# Patient Record
Sex: MALE | Race: OTHER
[De-identification: names, ages, dates, MRNs, and addresses within clinical notes are randomized per-mention and may not be internally consistent; named-entity substitution may affect disease eponyms.]

---

## 2020-11-03 ENCOUNTER — HOSPITAL ENCOUNTER (EMERGENCY)
Dept: HOSPITAL 38 - CC.ED | Age: 20
Discharge: ANOTHER HOSPITAL | End: 2020-11-03
Payer: SELF-PAY

## 2020-11-03 DIAGNOSIS — K35.30: Primary | ICD-10-CM

## 2020-11-03 LAB
CHLORIDE SERPL-SCNC: 101 MEQ/L (ref 98–106)
SODIUM SERPL-SCNC: 138 MEQ/L (ref 136–145)

## 2020-11-03 NOTE — EDM.PDOC
ED HPI GENERAL MEDICAL PROBLEM





- General


Chief Complaint: Abdominal Pain


Stated Complaint: ABD PAIN


Time Seen by Provider: 11/03/20 10:30


Source of Information: Reports: 


History Limitations: Reports: No Limitations





- History of Present Illness


INITIAL COMMENTS - FREE TEXT/NARRATIVE: 


Antonio is a 20 yr old who presents to the ED with complaints of abdominal pain. 

Unable to speak or understand English and currently using  for 

assistance. He states the pain started in the middle of the night. States he has

been vomiting as well since onset. Points to upper abdomen/epigastric area to 

where his pain is at. He is having a difficult time getting comfortable in the 

ED. Denies any diarrhea. Last known bowel movement was yesterday morning adn was

normal. Last known meal was yesterday evening and ate chicken he had bought that

was already cooked. Denies any other past medical history. States he did take a 

pill this morning but isn't sure what pill he took as it is in English. 


Duration: Constant


Location: Reports: Abdomen


  ** Abdominal


Pain Score (Numeric/FACES): 10





- Related Data


                                    Allergies











Allergy/AdvReac Type Severity Reaction Status Date / Time


 


No Known Allergies Allergy   Verified 11/03/20 10:22











Home Meds: 


                                    Home Meds





. [No Known Home Meds]  11/03/20 [History]











Past Medical History





- Past Health History


Medical/Surgical History: Denies Medical/Surgical History





Social & Family History





- Family History


Family Medical History: Noncontributory





- Tobacco Use


Tobacco Use Status *Q: Never Tobacco User





- Alcohol Use


Date of Last Drink: 11/02/20


Alcohol Use in Last Twelve Months: Yes


Alcohol Use Frequency: Socially


Alcohol Use Comment: Last alcohol use was 2 beers last night





- Recreational Drug Use


Recreational Drug Use: No





ED ROS GENERAL





- Review of Systems


Review Of Systems: See Below


Constitutional: Reports: No Symptoms.  Denies: Fever, Decreased Appetite


HEENT: Reports: No Symptoms


Respiratory: Reports: No Symptoms


Cardiovascular: Reports: No Symptoms


GI/Abdominal: Reports: Abdominal Pain, Nausea, Vomiting.  Denies: Bloody Stool, 

Diarrhea, Flatus


: Reports: No Symptoms


Musculoskeletal: Reports: Back Pain (states from throwing up a lot)


Skin: Reports: No Symptoms


Neurological: Reports: No Symptoms


Psychiatric: Reports: No Symptoms





ED EXAM, GI/ABD





- Physical Exam


Exam: See Below


Exam Limited By: Language Barrier


General Appearance: Alert, Mild Distress, Moderate Distress


Eyes: Bilateral: Normal Appearance


Nose: Normal Inspection, Normal Mucosa, No Blood


Throat/Mouth: Normal Inspection, Normal Lips, Normal Teeth, Normal Gums, Normal 

Oropharynx, Normal Voice, No Airway Compromise


Head: Atraumatic, Normocephalic


Neck: Normal Inspection, Supple, Non-Tender


Respiratory/Chest: No Respiratory Distress, Lungs Clear, Normal Breath Sounds, 

No Accessory Muscle Use


Cardiovascular: Normal Peripheral Pulses, Regular Rate, Rhythm, No Edema, No 

Murmur


GI/Abdominal Exam: Normal Bowel Sounds, No Organomegaly, No Distention, Tender 

(upper abdomen ), Other (negative Rhosving, negative McBurney's point)


Back Exam: No: CVA Tenderness (L), CVA Tenderness (R)


Extremities: Normal Inspection, No Pedal Edema


Neurological: Alert, Normal Cognition, No Motor/Sensory Deficits


Psychiatric: Normal Affect, Normal Mood


Skin Exam: Warm, Dry, Intact, Normal Color, No Rash





Course





- Vital Signs


Last Recorded V/S: 


                                Last Vital Signs











Temp  99.6 F   11/03/20 12:58


 


Pulse  76   11/03/20 12:58


 


Resp  18   11/03/20 12:58


 


BP  111/72   11/03/20 12:58


 


Pulse Ox  99   11/03/20 12:58














- Orders/Labs/Meds


Orders: 


                               Active Orders 24 hr











 Category Date Time Status


 


 Abdomen Pelvis w Cont [CT] Stat Exams  11/03/20 11:13 Taken


 


 Potassium Chloride [KCL 20 MEQ in Water 100 ML] 20 meq Med  11/03/20 11:14 

Active





 Premix Bag 1 bag   





 IV ONETIME   


 


 Sodium Chloride 0.9% [Normal Saline] 1,000 ml Med  11/03/20 11:00 Active





 IV ASDIRECTED   








                                Medication Orders





Sodium Chloride (Normal Saline)  1,000 mls @ 250 mls/hr IV ASDIRECTED PAWAN


   Stop: 11/07/20 10:46


   Last Admin: 11/03/20 10:53  Dose: 250 mls/hr


   Documented by: LOY


Potassium Chloride 20 meq/ (Premix)  100 mls @ 25 mls/hr IV ONETIME ONE


   Stop: 11/03/20 15:13


   Last Admin: 11/03/20 11:44  Dose: 25 mls/hr


   Documented by: LOY








Labs: 


                                Laboratory Tests











  11/03/20 11/03/20 11/03/20 Range/Units





  10:12 10:12 10:30 


 


WBC   17.5 H   (5.0-10.0)  10^3/uL


 


RBC   5.84   (4.50-6.00)  10^6/uL


 


Hgb   16.3   (14.0-18.0)  g/dL


 


Hct   46.5   (40.0-54.0)  %


 


MCV   79.6 L   (82.0-94.0)  fL


 


MCH   27.9   (27.0-32.0)  pg


 


MCHC   35.1   (33.0-38.0)  g/dL


 


RDW Coeff of Stevenson   13.0   (11.0-15.0)  %


 


Plt Count   249   (150-400)  10^3/uL


 


Neut % (Auto)   91.3 H   (35-85)  %


 


Lymph % (Auto)   4.7 L   (10-55)  %


 


Mono % (Auto)   3.8   (0-16)  %


 


Eos % (Auto)   0.1   (0-5)  %


 


Baso % (Auto)   0.1   (0-3)  %


 


Neut # (Auto)   16.02 H   (1.80-7.00)  10^3/uL


 


Lymph # (Auto)   0.83 L   (1.00-4.80)  10^3/uL


 


Mono # (Auto)   0.66   (0.00-0.80)  10^3/uL


 


Eos # (Auto)   0.01   (0.00-0.45)  10^3/uL


 


Baso # (Auto)   0.02   10^3/uL


 


Sodium    138  (136-145)  mEq/L


 


Potassium    3.2 L  (3.5-5.0)  mEq/L


 


Chloride    101  ()  mEq/L


 


Carbon Dioxide    24  (21-32)  mmol/L


 


BUN    16  (7-18)  mg/dL


 


Creatinine    1.0  (0.7-1.3)  mg/dL


 


Est Cr Clr Drug Dosing    106.33  mL/min


 


Estimated GFR (MDRD)    > 60  (>=60)  mL/min


 


Glucose    121 H  (75-99)  mg/dL


 


Lactic Acid     (0.4-2.0)  mmol/L


 


Calcium    9.3  (8.4-10.1)  mg/dL


 


Total Bilirubin    2.2 H  (0.0-1.0)  mg/dL


 


AST    22  (15-37)  U/L


 


ALT    21  (12-78)  U/L


 


Alkaline Phosphatase    82  ()  U/L


 


C-Reactive Protein    0.2  (0.2-0.8)  mg/dL


 


Total Protein    8.1  (6.4-8.2)  g/dL


 


Albumin    4.8  (3.4-5.0)  g/dL


 


Amylase    27  ()  U/L


 


Lipase    49 L  ()  U/L


 


Urine Color  Yellow    (YELLOW)  


 


Urine Appearance  Cloudy    (CLEAR)  


 


Urine pH  8.5 H    (4.5-8.0)  


 


Ur Specific Gravity  1.025 H    (1.003-1.020)  


 


Urine Protein  Negative    (NEGATIVE)  mg/dL


 


Urine Glucose (UA)  Negative    (NEGATIVE)  mg/dL


 


Urine Ketones  40 H    (NEGATIVE)  mg/dL


 


Urine Occult Blood  Trace-intact H    (NEGATIVE)  


 


Urine Nitrite  Negative    (NEGATIVE)  


 


Urine Bilirubin  Negative    (NEGATIVE)  


 


Urine Urobilinogen  0.2    (0.2-1.0)  EU/dL


 


Ur Leukocyte Esterase  Negative    (NEGATIVE)  


 


Urine RBC  5-10 H    (0-5)  /HPF


 


Urine WBC  Not seen    (0-5)  /HPF














  11/03/20 Range/Units





  10:30 


 


WBC   (5.0-10.0)  10^3/uL


 


RBC   (4.50-6.00)  10^6/uL


 


Hgb   (14.0-18.0)  g/dL


 


Hct   (40.0-54.0)  %


 


MCV   (82.0-94.0)  fL


 


MCH   (27.0-32.0)  pg


 


MCHC   (33.0-38.0)  g/dL


 


RDW Coeff of Stevenson   (11.0-15.0)  %


 


Plt Count   (150-400)  10^3/uL


 


Neut % (Auto)   (35-85)  %


 


Lymph % (Auto)   (10-55)  %


 


Mono % (Auto)   (0-16)  %


 


Eos % (Auto)   (0-5)  %


 


Baso % (Auto)   (0-3)  %


 


Neut # (Auto)   (1.80-7.00)  10^3/uL


 


Lymph # (Auto)   (1.00-4.80)  10^3/uL


 


Mono # (Auto)   (0.00-0.80)  10^3/uL


 


Eos # (Auto)   (0.00-0.45)  10^3/uL


 


Baso # (Auto)   10^3/uL


 


Sodium   (136-145)  mEq/L


 


Potassium   (3.5-5.0)  mEq/L


 


Chloride   ()  mEq/L


 


Carbon Dioxide   (21-32)  mmol/L


 


BUN   (7-18)  mg/dL


 


Creatinine   (0.7-1.3)  mg/dL


 


Est Cr Clr Drug Dosing   mL/min


 


Estimated GFR (MDRD)   (>=60)  mL/min


 


Glucose   (75-99)  mg/dL


 


Lactic Acid  1.2  (0.4-2.0)  mmol/L


 


Calcium   (8.4-10.1)  mg/dL


 


Total Bilirubin   (0.0-1.0)  mg/dL


 


AST   (15-37)  U/L


 


ALT   (12-78)  U/L


 


Alkaline Phosphatase   ()  U/L


 


C-Reactive Protein   (0.2-0.8)  mg/dL


 


Total Protein   (6.4-8.2)  g/dL


 


Albumin   (3.4-5.0)  g/dL


 


Amylase   ()  U/L


 


Lipase   ()  U/L


 


Urine Color   (YELLOW)  


 


Urine Appearance   (CLEAR)  


 


Urine pH   (4.5-8.0)  


 


Ur Specific Gravity   (1.003-1.020)  


 


Urine Protein   (NEGATIVE)  mg/dL


 


Urine Glucose (UA)   (NEGATIVE)  mg/dL


 


Urine Ketones   (NEGATIVE)  mg/dL


 


Urine Occult Blood   (NEGATIVE)  


 


Urine Nitrite   (NEGATIVE)  


 


Urine Bilirubin   (NEGATIVE)  


 


Urine Urobilinogen   (0.2-1.0)  EU/dL


 


Ur Leukocyte Esterase   (NEGATIVE)  


 


Urine RBC   (0-5)  /HPF


 


Urine WBC   (0-5)  /HPF











Meds: 


Medications











Generic Name Dose Route Start Last Admin





  Trade Name Freq  PRN Reason Stop Dose Admin


 


Sodium Chloride  1,000 mls @ 250 mls/hr  11/03/20 11:00  11/03/20 10:53





  Normal Saline  IV  11/07/20 10:46  250 mls/hr





  ASDIRECTED PAWAN   Administration


 


Potassium Chloride 20 meq/  100 mls @ 25 mls/hr  11/03/20 11:14  11/03/20 11:44





  Premix  IV  11/03/20 15:13  25 mls/hr





  ONETIME ONE   Administration














Discontinued Medications














Generic Name Dose Route Start Last Admin





  Trade Name Freq  PRN Reason Stop Dose Admin


 


Iopamidol  100 ml  11/03/20 11:40  11/03/20 11:42





  Isovue-370 (76%)  IVPUSH  11/03/20 11:41  100 ml





  ONETIME ONE   Administration


 


Ketorolac Tromethamine  15 mg  11/03/20 10:45  11/03/20 10:53





  Toradol  IVPUSH  11/03/20 10:46  15 mg





  ONETIME ONE   Administration


 


Ondansetron HCl  4 mg  11/03/20 10:42  11/03/20 10:44





  Zofran  IVPUSH  11/03/20 10:43  4 mg





  Q6H ONE   Administration














Departure





- Departure


Time of Disposition: 13:00


Disposition: DC/Tfer to Acute Hospital [facility]


Clinical Impression: 


Acute appendicitis


Qualifiers:


 Acute appendicitis type: with localized peritonitis Appendicitis gangrene pr

esence: without gangrene Appendicitis perforation presence: without perforation 

Appendicitis abscess presence: without abscess Qualified Code(s): K35.30 - Acute

appendicitis with localized peritonitis, without perforation or gangrene








- Discharge Information


Forms:  ED Department Discharge


Additional Instructions: 


1) Transfer to [facility] for surgical intervention for acute appendicitis





2) Remain NPO 





3) Transfer via S 





Sepsis Event Note (ED)





- Evaluation


Sepsis Screening Result: No Definite Risk





- Focused Exam


Vital Signs: 


                                   Vital Signs











  Temp Pulse Resp BP Pulse Ox


 


 11/03/20 12:58  99.6 F  76  18  111/72  99


 


 11/03/20 12:00     110/65 


 


 11/03/20 11:15     98/66 


 


 11/03/20 10:17  96.8 F L  75  24 H  142/104 H  99














- Problem List & Annotations


(1) Acute appendicitis


SNOMED Code(s): 99633064


   Code(s): K35.80 - UNSPECIFIED ACUTE APPENDICITIS   Status: Acute   Current 

Visit: Yes   


Qualifiers: 


   Acute appendicitis type: with localized peritonitis   Appendicitis gangrene 

presence: without gangrene   Appendicitis perforation presence: without 

perforation   Appendicitis abscess presence: without abscess   Qualified 

Code(s): K35.30 - Acute appendicitis with localized peritonitis, without 

perforation or gangrene   





- Problem List Review


Problem List Initiated/Reviewed/Updated: Yes





- My Orders


Last 24 Hours: 


My Active Orders





11/03/20 11:00


Sodium Chloride 0.9% [Normal Saline] 1,000 ml IV ASDIRECTED 





11/03/20 11:13


Abdomen Pelvis w Cont [CT] Stat 





11/03/20 11:14


Potassium Chloride [KCL 20 MEQ in Water 100 ML] 20 meq   Premix Bag 1 bag IV 

ONETIME 














- Assessment/Plan


Last 24 Hours: 


My Active Orders





11/03/20 11:00


Sodium Chloride 0.9% [Normal Saline] 1,000 ml IV ASDIRECTED 





11/03/20 11:13


Abdomen Pelvis w Cont [CT] Stat 





11/03/20 11:14


Potassium Chloride [KCL 20 MEQ in Water 100 ML] 20 meq   Premix Bag 1 bag IV 

ONETIME 











Plan: 


CT of the abdomen/pelvis confirmed acute appendicitis. Per radiology report, 

appendix is about 9mm with some streaking noted adjacently. While waiting on CT 

report, patient has been receiving IV fluids with 20 mEq of potassium chloride. 

Will d/c at this time, roughly 10 mEq received, for transfer via BLS. Consulted 

with Dr. Rapp, general surgeon, at [facility] who kindly accepted transfer. 

Attempted to contact Antonio's employer as he has no other contact, which we were 

unavailable to get in touch with them. We will continue to try calling and let 

them know Antonio's current condition and being transferred to [facility]. Patient has 

been stable in ED and blood pressure has significantly improved. No antibiotic 

given in ED as patient is being transferred at this time. Will continue IV 

fluids while en route. 





Risks and benefits of transfer discussed with patient via . Risks of 

transfer included worsening of condition, MVA, increased pain. Benefits of 

transfer included higher level of care facility, critical care monitoring and 

surgical intervention. Risks of non-transfer included lack of specialized 

care/treatment/intervention, worsening of condition. Benefits of non-transfer 

included staying in familiar environment and close to home. Patient verbalized 

understanding and is in agreement with transfer.